# Patient Record
Sex: FEMALE | Race: WHITE | Employment: OTHER | URBAN - METROPOLITAN AREA
[De-identification: names, ages, dates, MRNs, and addresses within clinical notes are randomized per-mention and may not be internally consistent; named-entity substitution may affect disease eponyms.]

---

## 2018-01-04 NOTE — PATIENT DISCUSSION
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 This possibility had been discussed prior to the initial surgery. Options from observation, surgery. pneumatic and second opinion discussed. Risks including not limited to infection, bleeding, glaucoma, cataract, IOL damage, PVR, retinal scars, multiple surgeries, chronic pain, cosmetic changes, decreased ability to perform daily activities causing personal/professional damage, loss of vision/eye discussed. Recommended surgery to slow the progression of RD and possibly resolve RD. The possibility of multiple surgeries emphasized. PMD for medical clearance. Patient was asked to consider options and call if wishes to proceed. Second opinion was offered but refused. Risks of infection, bleeding, tear, detachment, ocular irritations and cosmetic changes among others discussed. Thorough hygiene and antibiotic use discussed. Call for any changes. Required head positioned discussed.

## 2018-01-04 NOTE — PATIENT DISCUSSION
Without surgery, there is about an 80 or 90 percent chance that the detachment will extend causing further vision loss.

## 2019-07-26 NOTE — PATIENT DISCUSSION
New Prescription: PreserVision AREDS 2 (vit c,a-ny-xavhi-lutein-zeaxan): capsule: 702-190-33-5 mg-unit-mg-mg 1 capsule twice a day as directed by mouth 07-

## 2019-07-31 ENCOUNTER — NEW PATIENT COMPREHENSIVE (OUTPATIENT)
Dept: URBAN - METROPOLITAN AREA CLINIC 38 | Facility: CLINIC | Age: 59
End: 2019-07-31

## 2019-07-31 DIAGNOSIS — H52.203: ICD-10-CM

## 2019-07-31 DIAGNOSIS — H52.13: ICD-10-CM

## 2019-07-31 DIAGNOSIS — H52.4: ICD-10-CM

## 2019-07-31 DIAGNOSIS — Z46.0: ICD-10-CM

## 2019-07-31 PROCEDURE — 92015 DETERMINE REFRACTIVE STATE: CPT

## 2019-07-31 PROCEDURE — 92004 COMPRE OPH EXAM NEW PT 1/>: CPT

## 2019-07-31 PROCEDURE — 92310-2 LEVEL 2 CONTACT LENS MANAGEMENT

## 2019-07-31 PROCEDURE — 92310SDW STANDARD DAILY WEAR

## 2019-07-31 ASSESSMENT — VISUAL ACUITY
OD_CC: 20/20-2
OD_SC: J4
OS_SC: J2
OS_CC: J8
OD_CC: J12>
OS_SC: 20/200
OD_SC: 20/30
OS_CC: 20/30

## 2019-07-31 ASSESSMENT — TONOMETRY
OS_IOP_MMHG: 17
OD_IOP_MMHG: 16

## 2019-08-15 ENCOUNTER — CONTACT LENS FOLLOW UP (OUTPATIENT)
Dept: URBAN - METROPOLITAN AREA CLINIC 38 | Facility: CLINIC | Age: 59
End: 2019-08-15

## 2019-08-15 DIAGNOSIS — Z46.0: ICD-10-CM

## 2019-08-15 PROCEDURE — 92310F

## 2019-08-15 ASSESSMENT — VISUAL ACUITY
OS_CC: 20/50
OS_CC: J2
OD_CC: J12
OU_CC: 20/20
OU_CC: J2
OD_CC: 20/25-2

## 2020-03-13 NOTE — PATIENT DISCUSSION
Continue: Wilfredo 10 (vit c-e-zinc cit-lutein-zeaxan): tablet,chewable: 50 mg-15 unit- 4.5 mg-2.5 mg 1 tablet twice a day as directed by mouth 10-

## 2020-03-13 NOTE — PATIENT DISCUSSION
Continue: PreserVision AREDS 2 (vit c,q-wg-ottwy-lutein-zeaxan): capsule: 351-857-18-2 mg-unit-mg-mg 1 capsule twice a day as directed by mouth 07-

## 2021-05-18 NOTE — PATIENT DISCUSSION
New Prescription: PreserVision AREDS 2 (vit c,l-yx-muqaq-lutein-zeaxan): capsule: 734-785-17-8 mg-unit-mg-mg 1 capsule twice a day as directed by mouth 05-

## 2022-08-18 ENCOUNTER — EMERGENCY VISIT (OUTPATIENT)
Dept: URBAN - METROPOLITAN AREA CLINIC 38 | Facility: CLINIC | Age: 62
End: 2022-08-18

## 2022-08-18 DIAGNOSIS — H43.812: ICD-10-CM

## 2022-08-18 DIAGNOSIS — H04.123: ICD-10-CM

## 2022-08-18 PROCEDURE — 99214 OFFICE O/P EST MOD 30 MIN: CPT

## 2022-08-18 ASSESSMENT — VISUAL ACUITY
OS_SC: 20/200
OS_PH: 20/60
OD_PH: 20/30-2
OD_SC: 20/40-2

## 2022-08-18 ASSESSMENT — TONOMETRY
OD_IOP_MMHG: 14
OS_IOP_MMHG: 16

## 2024-03-08 ENCOUNTER — COMPREHENSIVE EXAM (OUTPATIENT)
Dept: URBAN - METROPOLITAN AREA CLINIC 38 | Facility: CLINIC | Age: 64
End: 2024-03-08

## 2024-03-08 DIAGNOSIS — H52.203: ICD-10-CM

## 2024-03-08 DIAGNOSIS — Z46.0: ICD-10-CM

## 2024-03-08 DIAGNOSIS — H52.4: ICD-10-CM

## 2024-03-08 DIAGNOSIS — H52.13: ICD-10-CM

## 2024-03-08 DIAGNOSIS — H04.123: ICD-10-CM

## 2024-03-08 PROCEDURE — 92310-2 LEVEL 2 CONTACT LENS MANAGEMENT

## 2024-03-08 PROCEDURE — 92015 DETERMINE REFRACTIVE STATE: CPT

## 2024-03-08 PROCEDURE — 92014 COMPRE OPH EXAM EST PT 1/>: CPT

## 2024-03-08 ASSESSMENT — VISUAL ACUITY
OD_CC: 20/25
OD_CC: J6
OU_CC: J3
OU_CC: 20/25
OS_CC: 20/40
OS_CC: J3

## 2024-03-08 ASSESSMENT — KERATOMETRY
OS_K2POWER_DIOPTERS: 43.00
OS_AXISANGLE2_DEGREES: 130
OS_K1POWER_DIOPTERS: 42.25
OD_K2POWER_DIOPTERS: 43.25
OD_AXISANGLE2_DEGREES: 55
OD_AXISANGLE_DEGREES: 145
OD_K1POWER_DIOPTERS: 42.25
OS_AXISANGLE_DEGREES: 40

## 2024-03-08 ASSESSMENT — TONOMETRY
OD_IOP_MMHG: 16
OS_IOP_MMHG: 16

## 2024-04-04 ENCOUNTER — FOLLOW UP (OUTPATIENT)
Dept: URBAN - METROPOLITAN AREA CLINIC 38 | Facility: CLINIC | Age: 64
End: 2024-04-04

## 2024-04-04 DIAGNOSIS — H52.13: ICD-10-CM

## 2024-04-04 DIAGNOSIS — H52.4: ICD-10-CM

## 2024-04-04 DIAGNOSIS — H04.123: ICD-10-CM

## 2024-04-04 DIAGNOSIS — H52.203: ICD-10-CM

## 2024-04-04 DIAGNOSIS — Z46.0: ICD-10-CM

## 2024-04-04 PROCEDURE — 92310F

## 2024-04-04 ASSESSMENT — VISUAL ACUITY
OU_CC: 20/25-1
OD_CC: 20/20-2
OS_CC: 20/50-2
OS_CC: J4
OD_CC: J5-
OU_CC: J3

## 2024-04-04 ASSESSMENT — KERATOMETRY
OS_AXISANGLE2_DEGREES: 130
OD_AXISANGLE2_DEGREES: 55
OS_K2POWER_DIOPTERS: 43.00
OD_AXISANGLE_DEGREES: 145
OS_K1POWER_DIOPTERS: 42.25
OD_K2POWER_DIOPTERS: 43.25
OS_AXISANGLE_DEGREES: 40
OD_K1POWER_DIOPTERS: 42.25

## 2024-05-10 ENCOUNTER — FOLLOW UP (OUTPATIENT)
Dept: URBAN - METROPOLITAN AREA CLINIC 38 | Facility: CLINIC | Age: 64
End: 2024-05-10

## 2024-05-10 DIAGNOSIS — H52.4: ICD-10-CM

## 2024-05-10 DIAGNOSIS — H04.123: ICD-10-CM

## 2024-05-10 DIAGNOSIS — Z46.0: ICD-10-CM

## 2024-05-10 DIAGNOSIS — H52.13: ICD-10-CM

## 2024-05-10 DIAGNOSIS — H52.203: ICD-10-CM

## 2024-05-10 PROCEDURE — 92310F

## 2024-05-10 ASSESSMENT — VISUAL ACUITY
OS_CC: 20/40-2
OD_CC: 20/25-1
OU_CC: 20/25-2

## 2024-05-10 ASSESSMENT — KERATOMETRY
OS_AXISANGLE_DEGREES: 40
OS_K2POWER_DIOPTERS: 43.00
OD_K1POWER_DIOPTERS: 42.25
OS_AXISANGLE2_DEGREES: 130
OS_K1POWER_DIOPTERS: 42.25
OD_AXISANGLE2_DEGREES: 55
OD_K2POWER_DIOPTERS: 43.25
OD_AXISANGLE_DEGREES: 145

## 2025-08-29 ENCOUNTER — PROBLEM FOCUSED VISIT (OUTPATIENT)
Age: 65
End: 2025-08-29

## 2025-08-29 DIAGNOSIS — H20.00: ICD-10-CM

## 2025-08-29 DIAGNOSIS — H04.123: ICD-10-CM

## 2025-08-29 PROCEDURE — 99214 OFFICE O/P EST MOD 30 MIN: CPT

## 2025-08-29 RX ORDER — PREDNISOLONE ACETATE 10 MG/ML: 1 SUSPENSION/ DROPS OPHTHALMIC
